# Patient Record
Sex: FEMALE | Race: OTHER | HISPANIC OR LATINO
[De-identification: names, ages, dates, MRNs, and addresses within clinical notes are randomized per-mention and may not be internally consistent; named-entity substitution may affect disease eponyms.]

---

## 2021-01-21 PROBLEM — Z00.00 ENCOUNTER FOR PREVENTIVE HEALTH EXAMINATION: Status: ACTIVE | Noted: 2021-01-21

## 2021-01-25 ENCOUNTER — APPOINTMENT (OUTPATIENT)
Dept: INTERNAL MEDICINE | Facility: CLINIC | Age: 61
End: 2021-01-25

## 2021-01-25 ENCOUNTER — APPOINTMENT (OUTPATIENT)
Dept: INTERNAL MEDICINE | Facility: CLINIC | Age: 61
End: 2021-01-25
Payer: MEDICAID

## 2021-01-25 PROCEDURE — 99443: CPT

## 2021-01-25 NOTE — PLAN
[FreeTextEntry1] : Needs to be seen. She refuses /. states she cannot come to the office due to her size and immobility. She has not elft her apt in a year\par  Will arrange for home doctors visit.

## 2021-01-25 NOTE — HISTORY OF PRESENT ILLNESS
[Home] : at home, [unfilled] , at the time of the visit. [Medical Office: ([facility])___] : at the medical office located in  [Verbal consent obtained from patient] : the patient, [unfilled] [de-identified] : 59 yo F with HTN, HLD, DM, Morbid Obesity.  \par Concern she has high blood pressure,  Took it at home  - was 180/87. Under a lot of stress.\jhony Lost her mother in November. \jhony Has diabetes but not on medication\par for her HTN she is on metoprolol 50mg once a day\par for cholesterol she is on simvastatin.   \par She reports she cannot leave her home. She is morbidly obese and weighs 450 lbs.  \jhony Has not seen a doctor in over a year.  Was living in [place] but moved here as she was ill.

## 2021-02-08 ENCOUNTER — TRANSCRIPTION ENCOUNTER (OUTPATIENT)
Age: 61
End: 2021-02-08

## 2021-04-12 RX ORDER — LOSARTAN POTASSIUM 50 MG/1
50 TABLET, FILM COATED ORAL DAILY
Qty: 90 | Refills: 3 | Status: ACTIVE | COMMUNITY
Start: 2021-04-12 | End: 1900-01-01

## 2021-04-21 ENCOUNTER — APPOINTMENT (OUTPATIENT)
Dept: INTERNAL MEDICINE | Facility: CLINIC | Age: 61
End: 2021-04-21
Payer: SELF-PAY

## 2021-04-21 DIAGNOSIS — E78.5 HYPERLIPIDEMIA, UNSPECIFIED: ICD-10-CM

## 2021-04-21 DIAGNOSIS — I10 ESSENTIAL (PRIMARY) HYPERTENSION: ICD-10-CM

## 2021-04-21 DIAGNOSIS — E11.9 TYPE 2 DIABETES MELLITUS W/OUT COMPLICATIONS: ICD-10-CM

## 2021-04-21 PROCEDURE — 99214 OFFICE O/P EST MOD 30 MIN: CPT | Mod: 95

## 2021-04-21 RX ORDER — ATORVASTATIN CALCIUM 20 MG/1
20 TABLET, FILM COATED ORAL
Qty: 90 | Refills: 3 | Status: ACTIVE | COMMUNITY
Start: 2021-04-21 | End: 1900-01-01

## 2021-04-21 RX ORDER — SIMVASTATIN 40 MG/1
40 TABLET, FILM COATED ORAL
Qty: 30 | Refills: 5 | Status: DISCONTINUED | COMMUNITY
Start: 2021-04-21 | End: 2021-04-21

## 2021-04-21 RX ORDER — AMLODIPINE BESYLATE 10 MG/1
10 TABLET ORAL
Qty: 90 | Refills: 3 | Status: ACTIVE | COMMUNITY
Start: 2021-04-12 | End: 1900-01-01

## 2021-04-21 NOTE — HISTORY OF PRESENT ILLNESS
[Home] : at home, [unfilled] , at the time of the visit. [Medical Office: ([facility])___] : at the medical office located in  [Verbal consent obtained from patient] : the patient, [unfilled] [de-identified] : 61 yo F with HTN, HLD, DM, Morbid Obesity.  \par She is \par 180/90.  she is feeling better.  Currently on  losartan 50mg.  \par Has had any labs\par not able to start amlodipine  due to zocor.  \par  \par HHA comes in to help.  has not left her home.  Eating a lot.  Getting a therpsit to come by.  Walking the hallway. \par Not taking anything for her daibetes -  She hough not check her FSs at home.\par

## 2021-04-21 NOTE — PLAN
[FreeTextEntry1] : HLD - stop simvastatin, start atorvastatin\par HTN- add amlodipine 10mg to regimen\par DM- set up home lab draw....potentially start Metformin and/or SGLTs\par \par f/up one month,

## 2021-10-04 ENCOUNTER — APPOINTMENT (OUTPATIENT)
Dept: INTERNAL MEDICINE | Facility: CLINIC | Age: 61
End: 2021-10-04